# Patient Record
Sex: FEMALE | Race: OTHER | Employment: PART TIME | URBAN - METROPOLITAN AREA
[De-identification: names, ages, dates, MRNs, and addresses within clinical notes are randomized per-mention and may not be internally consistent; named-entity substitution may affect disease eponyms.]

---

## 2020-09-03 PROBLEM — R10.2 PELVIC PAIN: Status: ACTIVE | Noted: 2020-09-03

## 2021-06-23 ENCOUNTER — HOSPITAL ENCOUNTER (OUTPATIENT)
Dept: PHYSICAL THERAPY | Age: 46
Discharge: HOME | End: 2021-06-23
Payer: COMMERCIAL

## 2021-06-23 PROCEDURE — 97162 PT EVAL MOD COMPLEX 30 MIN: CPT

## 2021-06-23 NOTE — PROGRESS NOTES
[address] [place] [address] PHYSICAL THERAPY   [address],[facility], [facility] - Phone: [phone]  Fax: [phone] / [facility]  Patient Name: Mj Schreiber : 1975   Medical   Diagnosis: Gait instability [R26.81]  Dizziness and giddiness [R42] Treatment Diagnosis: Gait instability [R26.81]  Dizziness and giddiness [R42]   Onset Date: 2021     Referral Source: Ellen Collado MD [facility]): 2021   Prior Hospitalization:in 2018 See medical history Provider #: [id number]   Prior Level of Function: Symptom free with ADLs   Comorbidities: Hypercholesterolemia   Medications: Verified on Patient Summary List   The Plan of Care and following information is based on the information from the initial evaluation.   ===========================================================================================  Assessment / quiroz information:  Mj Schreiber is a 39 y.o. female who presents to PT with chief complaint of vertigo with looking up and to the right, with twisting movements and when turning onto the right side. She also reports pressure right side of the neck. She denies imbalance and other forms of dizziness. In  she had similar symptoms lasting 10 days which completely resolved. In 2021 symptoms restarted insidiously. They were stronger the first 2 weeks. Symptoms improve with walking. She reports having VFT but does not know the results. Objectively, the patient demonstrates motion sensitivity, impaired use of vestibular input  and decreased quality of life due to dizziness(DGI = 26 points). There was tenderness to palpation over right SCM muscle and bilateral upper trapezius muscles. Pt will benefit from PT/Vestibular rehab to address these deficits, improve functional independence and reduce dizziness and imbalance with normal daily activities.  Thank you for this referral.     ===========================================================================================  Eval Complexity: History MEDIUM  Complexity : 1-2 comorbidities / personal factors will impact the outcome/ POC ;  Examination  HIGH Complexity : 4+ Standardized tests and measures addressing body structure, function, activity limitation and / or participation in recreation ; Presentation MEDIUM Complexity : Evolving with changing characteristics ; Decision Making Other outcome measures Subjective improvement in symptoms with ADLs  HIGH ; Overall Complexity MEDIUM  Problem List: impaired gait/ balance, decrease ADL/ functional abilitiies, decrease activity tolerance and other vertigo   Treatment Plan may include any combination of the following: Therapeutic exercise, Therapeutic activities, Neuromuscular re-education, Physical agent/modality, Gait/balance training, Manual therapy, Patient education and Functional mobility training  Patient / Family readiness to learn indicated by: asking questions, trying to perform skills and interest  Persons(s) to be included in education: patient (P)  Barriers to Learning/Limitations:  none  Measures taken, if barriers to learning:    Patient Goal (s): Get better   Patient self reported health status: good  Rehabilitation Potential: good   Short Term Goals: To be accomplished in  4  weeks:  1. Patient will report 25% improvement in symptoms with ADLs. 2. Patient to perform HEP TID to reduce imbalance and dizziness with ADLs. 3. Patient to maintain 2x4 12  seconds eyes closed  to indicate improved . use of vestibular input for ADLs       4. Decrease DHI to 21 points to indicate improved QOL.  Long Term Goals: To be accomplished in  8  weeks:  1. Patient will improve score on DHI to 16 indicating improved quality of life. 2.  Patient independent with HEP. 3.  Patient will report 50% improvement in symptoms with ADLs.   4. Patient to maintain 2x4 30  seconds eyes closed  to indicate improved . use of vestibular input for ADLs. Frequency / Duration:   Patient to be seen  1  times per week for 8  weeks:  Patient / Caregiver education and instruction: activity modification and exercises  Therapist Signature: Ed Zaldivar PT Date: 0/86/3044   Certification Period: na Time: 1:50 PM   ===========================================================================================  I certify that the above Physical Therapy Services are being furnished while the patient is under my care. I agree with the treatment plan and certify that this therapy is necessary. Physician Signature:        Date:       Time:                                        Joe Ortega MD  Please sign and return to [facility] at [facility], [facility]. or you may fax the signed copy to [phone]. Thank you. To ensure your patient receives the highest quality care and to avoid disruption in therapy please sign and return this plan of care within 21 days. Per Medicaid guidelines if the plan of care is not received within 21 days the patient's care must be put on hold until signed.

## 2021-06-23 NOTE — PROGRESS NOTES
PHYSICAL THERAPY - DAILY TREATMENT NOTE    Patient Name: Tanika Delacruz        Date: 2021  : 1975   yes Patient  Verified  Visit #:   1   of   8  Insurance: Payor: [address] / Plan: [address] / Product Type: Managed Care Medicaid /      In time: 2:04 Out time: 2:51   Total Treatment Time: 47     TREATMENT AREA =  Gait instability [R26.81]  Dizziness and giddiness [R42]    SUBJECTIVE  Pain Level (on 0 to 10 scale):  5  / 10   Medication Changes/New allergies or changes in medical history, any new surgeries or procedures?    no  If yes, update Summary List   Subjective Functional Status/Changes:  []  No changes reported     See POC          OBJECTIVE        Billed With/As:   [] TE   [] TA   [] Neuro   [] Self Care Patient Education: [x] Pt educated in vestibular system function and dysfunction, use of senses for balance and central compensation. Reviewed test results. [] Progressed/Changed HEP based on:   [] positioning   [] body mechanics   [] transfers   [] heat/ice application    [] other:      Other Objective/Functional Measures:    See POC     Post Treatment Pain Level (on 0 to 10) scale:   5  / 10     ASSESSMENT  Assessment/Changes in Function:     Justification for Eval Code Complexity:  Patient History : See POC  Examination see exam   Clinical Presentation: evolving  Clinical Decision Making : Subjective improvement in symptoms with ADLs   []  See Progress Note/Recertification   Patient will continue to benefit from skilled PT services to modify and progress therapeutic interventions, address functional mobility deficits, address imbalance/dizziness and instruct in home and community integration to attain remaining goals.    Progress toward goals / Updated goals:    Initial eval completed     PLAN  []  Upgrade activities as tolerated no Continue plan of care   []  Discharge due to :    []  Other:      Therapist: Maico Ramirez, PT    Date: 2021 Time: 2:51 PM     Future Appointments   Date Time Provider Tom Mary   7/2/2021  8:15 AM Rachna Hernandez, MARTINEZ [facility]   7/9/2021  7:30 AM Rachna Hernandez PT [facility]   7/16/2021 10:00 AM Rachna Hernandez PT [facility]   7/20/2021  2:00 PM Rachna Hernandez PT [facility]   7/27/2021  2:00 PM Rachna Hernandez, MARTINEZ [address] [address]

## 2021-06-24 ENCOUNTER — APPOINTMENT (OUTPATIENT)
Dept: PHYSICAL THERAPY | Age: 46
End: 2021-06-24

## 2021-07-02 ENCOUNTER — HOSPITAL ENCOUNTER (OUTPATIENT)
Dept: PHYSICAL THERAPY | Age: 46
Discharge: HOME | End: 2021-07-02
Payer: COMMERCIAL

## 2021-07-02 PROCEDURE — 97116 GAIT TRAINING THERAPY: CPT

## 2021-07-02 PROCEDURE — 97110 THERAPEUTIC EXERCISES: CPT

## 2021-07-02 PROCEDURE — 97112 NEUROMUSCULAR REEDUCATION: CPT

## 2021-07-02 NOTE — PROGRESS NOTES
PHYSICAL THERAPY - DAILY TREATMENT NOTE    Patient Name: Jana Hernandez        Date: 2021  : 1975   yes Patient  Verified  Visit #:   2   of   8  Insurance: [facility]: [facility] [address] [address] / Plan: [address] / Product Type: Managed Care Medicaid /      In time: 8:00 Out time: 8:53   Total Treatment Time: 53     Medicare/BCBS Time Tracking (below)   Total Timed Codes (min):  na 1:1 Treatment Time:  na     TREATMENT AREA =  Gait instability [R26.81]  Dizziness and giddiness [R42]    SUBJECTIVE  Pain Level (on 0 to 10 scale):  -7  / 10 neck   Medication Changes/New allergies or changes in medical history, any new surgeries or procedures?    no  If yes, update Summary List   Subjective Functional Status/Changes:  []  No changes reported     Patient reports that she saw MD. Raisa Pressley VNG results. .          OBJECTIVE  Modalities Rationale:     decrease pain and increase tissue extensibility and decrease dizziness to improve patient's ability to perform ADLs   min [] Estim, type/location:                                      []  att     []  unatt     []  w/US     []  w/ice    []  w/heat    min []  Mechanical Traction: type/lbs                   []  pro   []  sup   []  int   []  cont    []  before manual    []  after manual    min []  Ultrasound, settings/location:      min []  Iontophoresis w/ dexamethasone, location:                                               []  take home patch       []  in clinic   10 min [x]  Ice     []  Heat    location/position: Cervical/supine    min []  Vasopneumatic Device, press/temp:    If using vaso (only need to measure limb vaso being performed on)      pre-treatment girth :       post-treatment girth :       measured at (landmark location) :      min []  Other:    [x] Skin assessment post-treatment (if applicable):    [x]  intact    []  redness- no adverse reaction                  []redness  adverse reaction:      na min Therapeutic Exercise:  [x] See flow sheet   Rationale:      increase strength to improve the patients ability to perform ADLs     5 min Manual Therapy: Release/STM/DTM bilateral SCM, UT and levator scapula muscles   Rationale:      decrease pain and increase tissue extensibility to improve patient's ability to perform ADLs  The manual therapy interventions were performed at a separate and distinct time from the therapeutic activities interventions. min Therapeutic Activity: [x]  See flow sheet   Rationale:    na to improve the patients ability to na    20 min Neuromuscular Re-ed: [x]  See flow sheet   Rationale:   Promote central compensation for peripheral vestibular loss,  improve balance and and decrease vertigo and motion sensitivity to improve the patients ability to turn head, bend over and perform bed mobility    8 min Gait Training: See flowsheet   Rationale: To stimulate the use of vestibular input and decrease dizziness during ADLs  Billed With/As:   [x] TE   [] TA   [] Neuro   [] Self Care Patient Education: [x] Review HEP VSE seated, ice at least 1x day. Reviewed VNG test results and educated on vestibular rehabilitation considering diagnosis of right peripheral vestibular loss. [] Progressed/Changed HEP based on:   [] positioning   [] body mechanics   [] transfers   [] heat/ice application    [] other:      Other Objective/Functional Measures:  VNG 86% right unilateral weakness  Began VSE, balance, gait, manual therapy and ice     Post Treatment Pain Level (on 0 to 10) scale:   4  / 10     ASSESSMENT  Assessment/Changes in Function:     Patient able to tolerate all exercises without increase in symptoms. Continues to have tenderness over C/S musculature.      []  See Progress Note/Recertification   Patient will continue to benefit from skilled PT services to modify and progress therapeutic interventions, address functional mobility deficits, address imbalance/dizziness and instruct in home and community integration to attain remaining goals. Progress toward goals / Updated goals:    1. Patient will report 25% improvement in symptoms with ADLs. 2. Patient to perform HEP TID to reduce imbalance and dizziness with ADLs. 3. Patient to maintain 2x4 12  seconds eyes closed  to indicate improved . use of vestibular input for ADLs       4. Decrease DHI to 21 points to indicate improved QOL.      PLAN  [x]  Upgrade activities as tolerated yes Continue plan of care   []  Discharge due to :    []  Other:      Therapist: Christi Tierney PT    Date: 7/2/2021 Time: 8:53 AM     Future Appointments   Date Time Provider Tom Hernandez   7/2/2021  8:15 AM Jessica Weaver PT [facility]   7/9/2021  7:30 AM Jessica Weaver PT [facility]   7/16/2021 10:00 AM Jessica Weaver PT [facility]   7/20/2021  2:00 PM Jessica Weaver PT [facility]   7/27/2021  2:00 PM Jessica Weaver, MARTINEZ [facility]

## 2021-07-09 ENCOUNTER — HOSPITAL ENCOUNTER (OUTPATIENT)
Dept: PHYSICAL THERAPY | Age: 46
Discharge: HOME | End: 2021-07-09
Payer: COMMERCIAL

## 2021-07-09 PROCEDURE — 97112 NEUROMUSCULAR REEDUCATION: CPT

## 2021-07-09 PROCEDURE — 97530 THERAPEUTIC ACTIVITIES: CPT

## 2021-07-09 PROCEDURE — 97110 THERAPEUTIC EXERCISES: CPT

## 2021-07-09 PROCEDURE — 97116 GAIT TRAINING THERAPY: CPT

## 2021-07-09 NOTE — PROGRESS NOTES
PHYSICAL THERAPY - DAILY TREATMENT NOTE    Patient Name: Ching Beltran        Date: 2021  : 1975   yes Patient  Verified  Visit #:   3   of   8  Insurance: Payor: [address] / Plan: [address] / Product Type: Managed Care Medicaid /      In time: 10:00 Out time: 10:53   Total Treatment Time: 53     Medicare/BCBS Time Tracking (below)   Total Timed Codes (min):  na 1:1 Treatment Time:  na     TREATMENT AREA =  Gait instability [R26.81]  Dizziness and giddiness [R42]    SUBJECTIVE  Pain Level (on 0 to 10 scale):  7/ 10 neck   Medication Changes/New allergies or changes in medical history, any new surgeries or procedures?    no  If yes, update Summary List   Subjective Functional Status/Changes:  []  No changes reported     Patient reports that she did eye exercises for past 2 days. She did not start until then due to moving. Ice helps with neck pain but she was only able to do it 2 times since last seen.          OBJECTIVE  Modalities Rationale:     decrease pain and increase tissue extensibility and decrease dizziness to improve patient's ability to perform ADLs   min [] Estim, type/location:                                      []  att     []  unatt     []  w/US     []  w/ice    []  w/heat    min []  Mechanical Traction: type/lbs                   []  pro   []  sup   []  int   []  cont    []  before manual    []  after manual    min []  Ultrasound, settings/location:      min []  Iontophoresis w/ dexamethasone, location:                                               []  take home patch       []  in clinic   10 min [x]  Ice     []  Heat    location/position: Cervical/supine    min []  Vasopneumatic Device, press/temp:    If using vaso (only need to measure limb vaso being performed on)      pre-treatment girth :       post-treatment girth :       measured at (landmark location) :      min []  Other:    [x] Skin assessment post-treatment (if applicable):    [x] intact    []  redness- no adverse reaction                  []redness  adverse reaction:      10 min Therapeutic Exercise:  [x]  See flow sheet   Rationale:      increase strength to improve the patients ability to perform ADLs     5 min Manual Therapy: Release/STM/DTM bilateral SCM, UT and levator scapula muscles   Rationale:      decrease pain and increase tissue extensibility to improve patient's ability to perform ADLs  The manual therapy interventions were performed at a separate and distinct time from the therapeutic activities interventions. 8 min Therapeutic Activity: [x]  Self massage via therawand over UT and levator scapula muscles. Rationale: To decrease pain and dizziness to improve the patients ability to perform ADLs    12 min Neuromuscular Re-ed: [x]  See flow sheet   Rationale:   Promote central compensation for peripheral vestibular loss,  improve balance and and decrease vertigo and motion sensitivity to improve the patients ability to turn head, bend over and perform bed mobility    8 min Gait Training: See flowsheet   Rationale: To stimulate the use of vestibular input and decrease dizziness during ADLs  Billed With/As:   [x] TE   [] TA   [] Neuro   [] Self Care Patient Education: [x] Review HEP VSE seated, ice at least 1x day. Add UT and levator scapula gentle stretching. Begin self massage to UT and levator scapula muscles. Discussed importance of VSE 3x day to promote re balancing of inner ear system and ice to calm neck muscles. [] Progressed/Changed HEP based on:   [] positioning   [] body mechanics   [] transfers   [] heat/ice application    [] other:      Other Objective/Functional Measures: Added Cervical stretches and education in self massage to C/S muscles. Post Treatment Pain Level (on 0 to 10) scale:   7/ 10     ASSESSMENT  Assessment/Changes in Function:     Low compliance with HEP this week. All exercises performed without patient complaint of dizziness. Continued tenderness within C/S musculature and patient complaint of pain, tightness and HA. .      []  See Progress Note/Recertification   Patient will continue to benefit from skilled PT services to modify and progress therapeutic interventions, address functional mobility deficits, address imbalance/dizziness and instruct in home and community integration to attain remaining goals. Progress toward goals / Updated goals:    1. Patient will report 25% improvement in symptoms with ADLs. 2. Patient to perform HEP 3x day to reduce imbalance and dizziness with ADLs. 3. Patient to maintain 2x4 12  seconds eyes closed  to indicate improved . use of vestibular input for ADLs. Progressing Foam Romberg 30 seconds EC       4. Decrease DHI to 21 points to indicate improved QOL.      PLAN  [x]  Upgrade activities as tolerated yes Continue plan of care   []  Discharge due to :    []  Other:      Therapist: Jesus Caruso PT    Date: 7/9/2021 Time: 10:53 AM     Future Appointments   Date Time Provider Tom Hernandez   7/9/2021 10:00 AM Refugio Junior PT [facility]   7/16/2021 10:00 AM Refugio Junior PT [facility]   7/20/2021  2:00 PM Refugio Junior PT [facility]   7/27/2021  2:00 PM Refugio Junior PT [facility]

## 2021-07-16 ENCOUNTER — APPOINTMENT (OUTPATIENT)
Dept: PHYSICAL THERAPY | Age: 46
End: 2021-07-16
Payer: COMMERCIAL

## 2021-07-20 ENCOUNTER — HOSPITAL ENCOUNTER (OUTPATIENT)
Dept: PHYSICAL THERAPY | Age: 46
Discharge: HOME | End: 2021-07-20
Payer: COMMERCIAL

## 2021-07-20 PROCEDURE — 97112 NEUROMUSCULAR REEDUCATION: CPT

## 2021-07-20 PROCEDURE — 97530 THERAPEUTIC ACTIVITIES: CPT

## 2021-07-20 PROCEDURE — 97116 GAIT TRAINING THERAPY: CPT

## 2021-07-20 NOTE — PROGRESS NOTES
PHYSICAL THERAPY - DAILY TREATMENT NOTE    Patient Name: Malina Hirsch        Date: 2021  : 1975   yes Patient  Verified  Visit #:   4   of   8  Insurance: Payor: [address] / Plan: [address] / Product Type: Managed Care Medicaid /      In time: 2:15 Out time: 3:13   Total Treatment Time: 58     Medicare/BCBS Time Tracking (below)   Total Timed Codes (min):  na 1:1 Treatment Time:  na     TREATMENT AREA =  Gait instability [R26.81]  Dizziness and giddiness [R42]    SUBJECTIVE  Pain Level (on 0 to 10 scale):  8/ 10 neck   Medication Changes/New allergies or changes in medical history, any new surgeries or procedures?    no  If yes, update Summary List   Subjective Functional Status/Changes:  []  No changes reported     Patient reports that she went to ER secondary to a severe HA 2021. They gave her an IV pain medication and benedryl which helped a little. She reports that her dizziness is better. She did ice and self massage which helped for 2 days. She did VSE 5x day. She sees Dr. Chanelle Flores in one week. She has been doing the neck stretches every day.        OBJECTIVE  Modalities Rationale:     decrease pain and increase tissue extensibility and decrease dizziness to improve patient's ability to perform ADLs   min [] Estim, type/location:                                      []  att     []  unatt     []  w/US     []  w/ice    []  w/heat    min []  Mechanical Traction: type/lbs                   []  pro   []  sup   []  int   []  cont    []  before manual    []  after manual    min []  Ultrasound, settings/location:      min []  Iontophoresis w/ dexamethasone, location:                                               []  take home patch       []  in clinic   10 min [x]  Ice     []  Heat    location/position: Cervical/supine    min []  Vasopneumatic Device, press/temp:    If using vaso (only need to measure limb vaso being performed on)      pre-treatment girth :       post-treatment girth :       measured at (landmark location) :      min []  Other:    [x] Skin assessment post-treatment (if applicable):    [x]  intact    []  redness- no adverse reaction                  []redness  adverse reaction:      na min Therapeutic Exercise:  [x]  See flow sheet   Rationale:      increase strength to improve the patients ability to perform ADLs     5 min Manual Therapy: Release/STM/DTM bilateral SCM, UT and levator scapula muscles. SOR. Rationale:      decrease pain and increase tissue extensibility to improve patient's ability to perform ADLs  The manual therapy interventions were performed at a separate and distinct time from the therapeutic activities interventions. 8 min Therapeutic Activity: [x]  Patient practiced SOR via 2 balls. Rationale: To decrease pain and dizziness to improve the patients ability to perform ADLs    27 min Neuromuscular Re-ed: [x]  See flow sheet   Rationale:   Promote central compensation for peripheral vestibular loss,  improve balance and and decrease vertigo and motion sensitivity to improve the patients ability to turn head, bend over and perform bed mobility    8 min Gait Training: See flowsheet   Rationale: To stimulate the use of vestibular input and decrease dizziness during ADLs  Billed With/As:   [x] TE   [] TA   [] Neuro   [] Self Care Patient Education: [] Review HEP Hold VSE. Hold stretches. [] Progressed/Changed HEP based on:   [] positioning   [] body mechanics   [] transfers   [] heat/ice application    [] other:      Other Objective/Functional Measures:  Held VSE. 2x4 30/30   Post Treatment Pain Level (on 0 to 10) scale:   7/ 10     ASSESSMENT  Assessment/Changes in Function:     Improved use of vestibular input but continued HA, C?S muscular tenderness.      [x]  See Progress Note/Recertification   Patient will continue to benefit from skilled PT services to modify and progress therapeutic interventions, address functional mobility deficits, address imbalance/dizziness and instruct in home and community integration to attain remaining goals.    Progress toward goals / Updated goals:    See PN       PLAN  [x]  Upgrade activities as tolerated yes Continue plan of care   []  Discharge due to :    []  Other:      Therapist: Ford Barr PT    Date: 7/20/2021 Time: 3:13 PM     Future Appointments   Date Time Provider Tom Hernandez   7/20/2021  2:00 PM Diana Conley, PT [facility]   7/27/2021  2:00 PM Diana Conley, PT [address]

## 2021-07-20 NOTE — PROGRESS NOTES
[address] [facility] PHYSICAL THERAPY  [x]  At [facility] [address]. [address] [place] [address] [address] [id number] - Phone: [phone] Fax: [phone]    PROGRESS NOTE  Patient Name: Opal Malin : 1975   Treatment/Medical Diagnosis: Gait instability [R26.81]  Dizziness and giddiness [R42]   Referral Source: Marjorie Miranda MD     Date of Initial Visit: 2021 Attended Visits: 4 Missed Visits: 0     SUMMARY OF TREATMENT  PT has consisted of vestibular adaptation exercises, balance exercises, gait training, manual therapy, stretching exercises, ice and home exercise program.   CURRENT STATUS  Pt has made slow progress in PT with short term goals either met or progressing. Functional improvement Includes patient reporting decrease in dizziness. Objectively she demonstrates improved use of vestibular input with balance exercises. Functional impairments include continued neck pain with HA. She has tenderness to palpation over upper trapezius and levator scapula muscles. Vestibular adaptation exercises were held secondary to head movent may be increasing neck pain. Plan to continue PT to HA, neck pain and dizziness. Goal/Measure of Progress Goal Met? 1. Patient will report 25%  improvement in symptoms with ADLs    Status at last Eval: na Current Status: Patient reports decreased dizziness but no change in neck pain or HA. progressing   2. Patient to perform HEP 3x day to reduce imbalance and dizziness with ADLs. Status at last Eval: na Current Status: 5x day yes   3. Patient to maintain 2x4 12 seconds eyes closed to indicate improved . use of vestibular input for ADLs. Status at last Eval: 2 seconds Current Status: 30 seconds yes     New Goals to be achieved in __4__  weeks:  1. Patient will improve score on DHI to 21 indicating improved quality of life. 2.  Patient independent with HEP.     3.  Patient will report 50% improvement in symptom of dizziness with ADLs. 4.  Patient will report 25% improvement in HA and neck pain with ADLs. RECOMMENDATIONS  Continue PT 1x week for 4 weeks  If you have any questions/comments please contact us directly at [phone]  Thank you for allowing us to assist in the care of your patient. Therapist Signature: Yaneth Birmingham, PT Date: 7/20/2021     Time: 4:57 PM   NOTE TO PHYSICIAN:  PLEASE COMPLETE THE ORDERS BELOW AND FAX TO   [facility] at [facility] center: [phone]  If you are unable to process this request in 24 hours please contact our office: [phone].    ___ I have read the above report and request that my patient continue as recommended.   ___ I have read the above report and request that my patient continue therapy with the following changes/special instructions:_________________________________________________________   ___ I have read the above report and request that my patient be discharged from therapy.      Physician Signature:        Date:       Time:      Wood Adames MD

## 2021-07-27 ENCOUNTER — APPOINTMENT (OUTPATIENT)
Dept: PHYSICAL THERAPY | Age: 46
End: 2021-07-27
Payer: COMMERCIAL

## 2021-09-14 NOTE — PROGRESS NOTES
[address] [facility] THERAPY  [address] [place], [place] [address],[facility], [address] - Phone: [phone]  Fax: [phone]    DISCHARGE NOTE  Patient Name: Malina Hirsch : 1975   Treatment/Medical Diagnosis: Gait instability [R26.81]  Dizziness and giddiness [R42]   Referral Source: Marc Gonsales MD     Date of Initial Visit: 2021 Attended Visits: 4 Missed Visits: 0       SUMMARY OF TREATMENT  Pt attended only initial evaluation and     3     follow-ups and then did not return. Therefore a formal reassessment of goals was not performed. RECOMMENDATIONS  Discontinue physical therapy due to patient not returning. If you have any questions/comments please contact us directly at [phone]. Thank you for allowing us to assist in the care of your patient. Therapist Signature: Bud Anderson PT Date: 2021     Time: 9:58 AM     To ensure we are able to process the patients encounter and avoid risk of your patient receiving a bill for our services, please sign and return this discharge summary by 10/13/2021. (30days following DC date)  Physician signature __________________________________ Date________________ Time_____________.

## 2022-03-19 PROBLEM — R10.2 PELVIC PAIN: Status: ACTIVE | Noted: 2020-09-03

## 2023-02-01 RX ORDER — ATORVASTATIN CALCIUM 10 MG/1
TABLET, FILM COATED ORAL
COMMUNITY